# Patient Record
Sex: MALE | Race: WHITE | Employment: UNEMPLOYED | URBAN - NONMETROPOLITAN AREA
[De-identification: names, ages, dates, MRNs, and addresses within clinical notes are randomized per-mention and may not be internally consistent; named-entity substitution may affect disease eponyms.]

---

## 2017-03-28 ENCOUNTER — HOSPITAL ENCOUNTER (OUTPATIENT)
Dept: GENERAL RADIOLOGY | Age: 27
Discharge: HOME | End: 2017-03-28
Payer: COMMERCIAL

## 2017-03-28 DIAGNOSIS — R05.9 COUGH: ICD-10-CM

## 2017-03-28 PROCEDURE — 71020 XR CHEST STANDARD TWO VW: CPT

## 2017-04-07 ENCOUNTER — TRANSCRIBE ORDERS (OUTPATIENT)
Dept: ADMINISTRATIVE | Facility: HOSPITAL | Age: 27
End: 2017-04-07

## 2017-04-07 DIAGNOSIS — M79.641 RIGHT HAND PAIN: Primary | ICD-10-CM

## 2017-04-14 ENCOUNTER — APPOINTMENT (OUTPATIENT)
Dept: MRI IMAGING | Facility: HOSPITAL | Age: 27
End: 2017-04-14

## 2017-04-17 ENCOUNTER — HOSPITAL ENCOUNTER (OUTPATIENT)
Dept: MRI IMAGING | Facility: HOSPITAL | Age: 27
Discharge: HOME | End: 2017-04-17
Admitting: NURSE PRACTITIONER

## 2017-04-17 DIAGNOSIS — M79.641 RIGHT HAND PAIN: ICD-10-CM

## 2017-04-17 PROCEDURE — 73218 MRI UPPER EXTREMITY W/O DYE: CPT

## 2021-12-23 RX ORDER — OMEPRAZOLE 20 MG/1
20 CAPSULE, DELAYED RELEASE ORAL DAILY
COMMUNITY
End: 2023-02-11

## 2021-12-23 RX ORDER — ALBUTEROL SULFATE 90 UG/1
2 AEROSOL, METERED RESPIRATORY (INHALATION) EVERY 4 HOURS PRN
COMMUNITY

## 2022-01-27 ENCOUNTER — OFFICE VISIT (OUTPATIENT)
Dept: GASTROENTEROLOGY | Facility: CLINIC | Age: 32
End: 2022-01-27

## 2022-01-27 VITALS
HEART RATE: 75 BPM | TEMPERATURE: 98.8 F | OXYGEN SATURATION: 98 % | BODY MASS INDEX: 21.47 KG/M2 | SYSTOLIC BLOOD PRESSURE: 130 MMHG | DIASTOLIC BLOOD PRESSURE: 80 MMHG | HEIGHT: 70 IN | WEIGHT: 150 LBS

## 2022-01-27 DIAGNOSIS — Z01.818 PREOPERATIVE TESTING: Primary | ICD-10-CM

## 2022-01-27 DIAGNOSIS — K21.9 GASTROESOPHAGEAL REFLUX DISEASE, UNSPECIFIED WHETHER ESOPHAGITIS PRESENT: Primary | ICD-10-CM

## 2022-01-27 PROCEDURE — 99204 OFFICE O/P NEW MOD 45 MIN: CPT | Performed by: NURSE PRACTITIONER

## 2022-01-27 NOTE — PROGRESS NOTES
"Chief Complaint   Patient presents with   • Heartburn   • Vomiting     Morning Time only        PCP: Mireya Ramires APRN  REFER: Mireya Ramires APRN    Subjective     HPI    Patient presents to office with complaints of increase acid reflux x many years  He has experienced intermittent nausea with emesis, no hematemesis.  No abdominal pain.   Acid reflux previously controlled with omeprazole but he had not taken omeprazole consistently.   Recent difficulty with constipation, relieved with mag citrate.  No weight loss.  No dysphagia.   He has not resumed PPI use.  Denies frequent use of NSAIDs with exception of occasional ASA.      History reviewed. No pertinent past medical history.    Past Surgical History:   Procedure Laterality Date   • COLONOSCOPY         No outpatient medications have been marked as taking for the 1/27/22 encounter (Office Visit) with Yobany Kaba APRN.       Allergies   Allergen Reactions   • Lidocaine Other (See Comments)     unsure       Social History     Socioeconomic History   • Marital status: Single   Tobacco Use   • Smoking status: Current Every Day Smoker     Packs/day: 0.25   • Smokeless tobacco: Never Used   Substance and Sexual Activity   • Alcohol use: Never   • Drug use: Never   • Sexual activity: Defer       Review of Systems   Constitutional: Negative for unexpected weight change.   Respiratory: Negative for shortness of breath.    Cardiovascular: Negative for chest pain.   Gastrointestinal: Negative for abdominal pain and anal bleeding.       Objective     Vitals:    01/27/22 1042   BP: 130/80   Pulse: 75   Temp: 98.8 °F (37.1 °C)   SpO2: 98%   Weight: 68 kg (150 lb)   Height: 177.8 cm (70\")     Body mass index is 21.52 kg/m².    Physical Exam  Constitutional:       Appearance: Normal appearance. He is well-developed.   Eyes:      General: No scleral icterus.  Cardiovascular:      Rate and Rhythm: Regular rhythm.      Heart sounds: Normal heart sounds. No " murmur heard.      Pulmonary:      Effort: Pulmonary effort is normal. No accessory muscle usage.      Breath sounds: Normal breath sounds.   Abdominal:      General: Bowel sounds are normal. There is no distension.      Palpations: Abdomen is soft. There is no mass.      Tenderness: There is no abdominal tenderness. There is no guarding or rebound.   Skin:     General: Skin is warm and dry.      Coloration: Skin is not jaundiced.   Neurological:      Mental Status: He is alert.   Psychiatric:         Behavior: Behavior is cooperative.         Imaging Results (Most Recent)     None          Body mass index is 21.52 kg/m².    Assessment/Plan     Diagnoses and all orders for this visit:    1. Gastroesophageal reflux disease, unspecified whether esophagitis present (Primary)  -     Case Request; Standing  -     Case Request        ESOPHAGOGASTRODUODENOSCOPY WITH ANESTHESIA (N/A)    Take PPI 30 min prior to breakfast   Decrease caffeine, nicotine, etoh- all contribute to acid reflux  Do not eat 2-3 hours within lying down, elevated HOB 4-6 inches     Advised pt to stop use of NSAIDs, Fish Oil, and MV 5 days prior to procedure, per Dr Houser protocol.  Tylenol based products are ok to take.  Pt verbalized understanding.    The risk of the endoscopy were discussed in detail.  We discussed the risk of perforation (one out of 7657-2693, riskier with dilation), bleeding (one out of 500), and the rare risks of infection, adverse reaction to anesthesia, respiratory failure, cardiac failure including MI and adverse reaction to medications, etc.  We discussed consequences that could occur if a risk were to develop such as the need for hospitalization, blood transfusion, surgical intervention, medications, pain and disability and death.  Alternatives include not doing anything, or pursuing an UGI series which only offers a diagnosis with potential less accuracy compared to egd.  The patient verbalizes understanding and agrees to  proceed.  Precautions are currently being put in place due to COVID-19.  I have explained to Franko Miller they will be required to undergo COVID testing prior to their EGD.  Franko Miller verbalized understanding and was willing to proceed.            Yobany Kaba, APRN  01/27/22          There are no Patient Instructions on file for this visit.

## 2022-01-29 ENCOUNTER — LAB (OUTPATIENT)
Dept: LAB | Facility: HOSPITAL | Age: 32
End: 2022-01-29

## 2022-01-29 LAB — SARS-COV-2 ORF1AB RESP QL NAA+PROBE: NOT DETECTED

## 2022-01-29 PROCEDURE — C9803 HOPD COVID-19 SPEC COLLECT: HCPCS

## 2022-01-29 PROCEDURE — U0004 COV-19 TEST NON-CDC HGH THRU: HCPCS | Performed by: NURSE PRACTITIONER

## 2022-02-01 ENCOUNTER — HOSPITAL ENCOUNTER (OUTPATIENT)
Facility: HOSPITAL | Age: 32
Setting detail: HOSPITAL OUTPATIENT SURGERY
Discharge: HOME | End: 2022-02-01
Attending: INTERNAL MEDICINE | Admitting: INTERNAL MEDICINE

## 2022-02-01 ENCOUNTER — ANESTHESIA (OUTPATIENT)
Dept: GASTROENTEROLOGY | Facility: HOSPITAL | Age: 32
End: 2022-02-01

## 2022-02-01 ENCOUNTER — ANESTHESIA EVENT (OUTPATIENT)
Dept: GASTROENTEROLOGY | Facility: HOSPITAL | Age: 32
End: 2022-02-01

## 2022-02-01 VITALS
TEMPERATURE: 97.4 F | OXYGEN SATURATION: 98 % | HEART RATE: 60 BPM | WEIGHT: 150 LBS | DIASTOLIC BLOOD PRESSURE: 72 MMHG | HEIGHT: 69 IN | RESPIRATION RATE: 20 BRPM | SYSTOLIC BLOOD PRESSURE: 104 MMHG | BODY MASS INDEX: 22.22 KG/M2

## 2022-02-01 DIAGNOSIS — K21.9 GASTROESOPHAGEAL REFLUX DISEASE, UNSPECIFIED WHETHER ESOPHAGITIS PRESENT: ICD-10-CM

## 2022-02-01 PROCEDURE — 25010000002 PROPOFOL 10 MG/ML EMULSION: Performed by: NURSE ANESTHETIST, CERTIFIED REGISTERED

## 2022-02-01 PROCEDURE — 87081 CULTURE SCREEN ONLY: CPT | Performed by: INTERNAL MEDICINE

## 2022-02-01 PROCEDURE — 43239 EGD BIOPSY SINGLE/MULTIPLE: CPT | Performed by: INTERNAL MEDICINE

## 2022-02-01 RX ORDER — PROPOFOL 10 MG/ML
VIAL (ML) INTRAVENOUS AS NEEDED
Status: DISCONTINUED | OUTPATIENT
Start: 2022-02-01 | End: 2022-02-01 | Stop reason: SURG

## 2022-02-01 RX ORDER — SODIUM CHLORIDE 9 MG/ML
500 INJECTION, SOLUTION INTRAVENOUS CONTINUOUS PRN
Status: DISCONTINUED | OUTPATIENT
Start: 2022-02-01 | End: 2022-02-01 | Stop reason: HOSPADM

## 2022-02-01 RX ORDER — SODIUM CHLORIDE 0.9 % (FLUSH) 0.9 %
10 SYRINGE (ML) INJECTION AS NEEDED
Status: DISCONTINUED | OUTPATIENT
Start: 2022-02-01 | End: 2022-02-01 | Stop reason: HOSPADM

## 2022-02-01 RX ADMIN — SODIUM CHLORIDE 500 ML: 9 INJECTION, SOLUTION INTRAVENOUS at 10:56

## 2022-02-01 RX ADMIN — PROPOFOL 100 MG: 10 INJECTION, EMULSION INTRAVENOUS at 11:29

## 2022-02-01 NOTE — ANESTHESIA PREPROCEDURE EVALUATION
Anesthesia Evaluation     Patient summary reviewed   no history of anesthetic complications:  NPO Solid Status: > 8 hours  NPO Liquid Status: > 8 hours           Airway   Mallampati: I  TM distance: >3 FB  Neck ROM: full  Dental    (+) poor dentition        Pulmonary    (+) a smoker Current Smoked day of surgery,   Cardiovascular - negative cardio ROS  Exercise tolerance: excellent (>7 METS)        Neuro/Psych- negative ROS  GI/Hepatic/Renal/Endo    (+)  GERD,      Musculoskeletal (-) negative ROS    Abdominal    Substance History      OB/GYN          Other - negative ROS                       Anesthesia Plan    ASA 2     MAC     intravenous induction     Anesthetic plan, all risks, benefits, and alternatives have been provided, discussed and informed consent has been obtained with: patient.        CODE STATUS:

## 2022-02-01 NOTE — ANESTHESIA POSTPROCEDURE EVALUATION
Patient: Franko Miller    Procedure Summary     Date: 02/01/22 Room / Location: [facility] ENDOSCOPY 2 /  [facility]    Anesthesia Start: 1127 Anesthesia Stop: 1132    Procedure: ESOPHAGOGASTRODUODENOSCOPY WITH ANESTHESIA (N/A ) Diagnosis:       Gastroesophageal reflux disease, unspecified whether esophagitis present      (Gastroesophageal reflux disease, unspecified whether esophagitis present [K21.9])    Surgeons: Morgan Houser DO Provider: Paul Lawrence CRNA    Anesthesia Type: MAC ASA Status: 2          Anesthesia Type: MAC    Vitals  Vitals Value Taken Time   /72 02/01/22 1151   Temp     Pulse 52 02/01/22 1153   Resp 25 02/01/22 1135   SpO2 100 % 02/01/22 1153   Vitals shown include unvalidated device data.        Post Anesthesia Care and Evaluation    Patient location during evaluation: PHASE II  Patient participation: complete - patient participated  Level of consciousness: awake  Pain management: adequate  Airway patency: patent  Anesthetic complications: No anesthetic complications  PONV Status: none  Cardiovascular status: acceptable  Respiratory status: acceptable  Hydration status: acceptable  No anesthesia care post op

## 2022-02-02 LAB — UREASE TISS QL: NEGATIVE

## 2023-02-11 ENCOUNTER — HOSPITAL ENCOUNTER (EMERGENCY)
Facility: HOSPITAL | Age: 33
Discharge: HOME | End: 2023-02-11
Attending: EMERGENCY MEDICINE | Admitting: EMERGENCY MEDICINE
Payer: MEDICAID

## 2023-02-11 ENCOUNTER — APPOINTMENT (OUTPATIENT)
Dept: GENERAL RADIOLOGY | Facility: HOSPITAL | Age: 33
End: 2023-02-11
Payer: MEDICAID

## 2023-02-11 VITALS
SYSTOLIC BLOOD PRESSURE: 109 MMHG | WEIGHT: 150 LBS | HEIGHT: 69 IN | BODY MASS INDEX: 22.22 KG/M2 | HEART RATE: 56 BPM | OXYGEN SATURATION: 97 % | TEMPERATURE: 98.1 F | RESPIRATION RATE: 16 BRPM | DIASTOLIC BLOOD PRESSURE: 55 MMHG

## 2023-02-11 DIAGNOSIS — S60.221A CONTUSION OF RIGHT HAND, INITIAL ENCOUNTER: Primary | ICD-10-CM

## 2023-02-11 PROCEDURE — 73130 X-RAY EXAM OF HAND: CPT

## 2023-02-11 PROCEDURE — 99283 EMERGENCY DEPT VISIT LOW MDM: CPT

## 2023-02-11 RX ORDER — HYDROCODONE BITARTRATE AND ACETAMINOPHEN 5; 325 MG/1; MG/1
1 TABLET ORAL ONCE
Status: COMPLETED | OUTPATIENT
Start: 2023-02-11 | End: 2023-02-11

## 2023-02-11 RX ADMIN — HYDROCODONE BITARTRATE AND ACETAMINOPHEN 1 TABLET: 5; 325 TABLET ORAL at 21:26

## 2023-02-12 NOTE — ED PROVIDER NOTES
Subjective   History of Present Illness  32-year-old male presents emergency department with about 1 to 1/2 hours worth of pain and swelling to the right hand.  Right-hand-dominant.  Reports he tripped over a toy at his child's birthday party fell backwards catching himself with his fist behind him.  Pain in the area of the fourth and fifth metacarpal with swelling.  Decreased range of motion due to pain.  No numbness or tingling.  No other injuries.    Family history, surgical history, social history, current medications and allergies are reviewed with the patient and triage documentation and vitals are reviewed.    History provided by:  Patient   used: No        Review of Systems   Constitutional: Negative for chills and fever.   HENT: Negative for congestion and sore throat.    Eyes: Negative.    Respiratory: Negative.    Cardiovascular: Negative for chest pain and palpitations.   Gastrointestinal: Negative for abdominal pain, diarrhea, nausea and vomiting.   Endocrine: Negative.    Genitourinary: Negative.    Musculoskeletal: Positive for arthralgias and joint swelling. Negative for back pain and neck pain.   Skin: Negative for color change, rash and wound.   Allergic/Immunologic: Negative.    Neurological: Negative for weakness, light-headedness, numbness and headaches.   Hematological: Negative.    Psychiatric/Behavioral: Negative.        History reviewed. No pertinent past medical history.    Allergies   Allergen Reactions   • Lidocaine Other (See Comments)     unsure       Past Surgical History:   Procedure Laterality Date   • CARDIAC ABLATION     • COLONOSCOPY     • ENDOSCOPY N/A 2/1/2022    Procedure: ESOPHAGOGASTRODUODENOSCOPY WITH ANESTHESIA;  Surgeon: Morgan Houser DO;  Location: [facility] ENDOSCOPY;  Service: Gastroenterology;  Laterality: N/A;  preop; GERD  postop; normal   PCP Mireya Ramires        Family History   Adopted: Yes       Social History     Socioeconomic History   •  Marital status: Single   Tobacco Use   • Smoking status: Every Day     Packs/day: 0.25     Types: Cigarettes   • Smokeless tobacco: Never   Vaping Use   • Vaping Use: Some days   Substance and Sexual Activity   • Alcohol use: Never   • Drug use: Never   • Sexual activity: Defer           Objective   Physical Exam  Vitals and nursing note reviewed.   Constitutional:       General: He is not in acute distress.     Appearance: Normal appearance. He is normal weight. He is not ill-appearing.   HENT:      Head: Normocephalic and atraumatic.   Cardiovascular:      Rate and Rhythm: Normal rate and regular rhythm.      Pulses: Normal pulses.   Pulmonary:      Effort: Pulmonary effort is normal.      Breath sounds: Normal breath sounds.   Musculoskeletal:         General: Swelling, tenderness and signs of injury present. No deformity.      Right hand: Swelling, tenderness and bony tenderness present. No deformity or lacerations. Decreased range of motion. Normal strength. Normal sensation. There is no disruption of two-point discrimination. Normal capillary refill. Normal pulse.      Cervical back: Normal range of motion and neck supple. No tenderness.   Skin:     General: Skin is warm and dry.      Capillary Refill: Capillary refill takes less than 2 seconds.      Findings: No bruising or erythema.   Neurological:      General: No focal deficit present.      Mental Status: He is alert and oriented to person, place, and time.         Procedures  none         ED Course    Labs Reviewed - No data to display  XR Hand 3+ View Right    Result Date: 2/11/2023  Narrative: EXAM DESCRIPTION/TECHNIQUE: XR HAND 3+ VW RIGHT 2/11/2023 9:18 PM CST [facility]: XR HAND 3 OR MORE VIEWS CLINICAL HISTORY: 32 years Male, pain, swelling COMPARISON: None. FINDINGS: No acute fracture. No dislocation. No destructive or lytic lesion. Soft tissue demonstrate no acute abnormality.     Impression: 1.  No acute osseous injury of the right hand.  Electronically signed by:  Clair Liu MD, LEONARD  2/11/2023 9:38 PM CST Workstation: [id number]        Medical Decision Making  Amount and/or Complexity of Data Reviewed  Radiology: ordered. Decision-making details documented in ED Course.      Risk  Prescription drug management.      Xray imaging reveals no acute osseous abnormality.  Advised on symptomatic treatment and outpatient follow-up.  Agreeable to plan and discharge.  Advised on follow-up outpatient should symptoms persist for possible ligamentous injury or re-imaging.    Final diagnoses:   Contusion of right hand, initial encounter         ED Disposition  ED Disposition     ED Disposition   Discharge    Condition   Stable    Comment   --             [facility] - [facility]  [address] [place] [address] [phone]    As needed         Medication List      Stop    omeprazole 20 MG capsule  Commonly known as: Osmani Villalobos DO  02/11/23 223

## 2023-02-12 NOTE — DISCHARGE INSTRUCTIONS
Please return for new or worsening symptoms.  Rest, ice, compression and elevation help with discomfort.  Tylenol, Motrin per dosing instructions on package alternating every 4 hours for discomfort.

## 2023-03-04 ENCOUNTER — APPOINTMENT (OUTPATIENT)
Dept: GENERAL RADIOLOGY | Facility: HOSPITAL | Age: 33
End: 2023-03-04
Payer: MEDICAID

## 2023-03-04 ENCOUNTER — HOSPITAL ENCOUNTER (EMERGENCY)
Facility: HOSPITAL | Age: 33
Discharge: HOME | End: 2023-03-04
Attending: FAMILY MEDICINE | Admitting: FAMILY MEDICINE
Payer: MEDICAID

## 2023-03-04 VITALS
HEART RATE: 86 BPM | SYSTOLIC BLOOD PRESSURE: 130 MMHG | DIASTOLIC BLOOD PRESSURE: 75 MMHG | RESPIRATION RATE: 18 BRPM | BODY MASS INDEX: 21.47 KG/M2 | OXYGEN SATURATION: 99 % | WEIGHT: 150 LBS | TEMPERATURE: 97.6 F | HEIGHT: 70 IN

## 2023-03-04 DIAGNOSIS — S63.91XA SPRAIN OF RIGHT HAND, INITIAL ENCOUNTER: Primary | ICD-10-CM

## 2023-03-04 PROCEDURE — 99283 EMERGENCY DEPT VISIT LOW MDM: CPT

## 2023-03-04 PROCEDURE — 73130 X-RAY EXAM OF HAND: CPT

## 2023-03-04 RX ORDER — HYDROCODONE BITARTRATE AND ACETAMINOPHEN 5; 325 MG/1; MG/1
1 TABLET ORAL ONCE
Status: COMPLETED | OUTPATIENT
Start: 2023-03-04 | End: 2023-03-04

## 2023-03-04 RX ORDER — MELOXICAM 15 MG/1
15 TABLET ORAL DAILY
Qty: 30 TABLET | Refills: 0 | Status: SHIPPED | OUTPATIENT
Start: 2023-03-04

## 2023-03-04 RX ADMIN — HYDROCODONE BITARTRATE AND ACETAMINOPHEN 1 TABLET: 5; 325 TABLET ORAL at 20:52

## 2023-03-05 NOTE — ED PROVIDER NOTES
Subjective   History of Present Illness  Patient states that he punched a wall in the house yesterday and presents emergency department today with continued swelling and pain in his right hand.  He has full range of motion of his wrist, but does have swelling over the third fourth and fifth metacarpals.    Hand Injury  Location:  Hand  Hand location:  R hand  Injury: yes    Time since incident:  2 days  Pain details:     Quality:  Aching    Radiates to:  Does not radiate    Severity:  Moderate    Duration:  2 days    Timing:  Intermittent    Progression:  Waxing and waning  Dislocation: no    Foreign body present:  No foreign bodies  Relieved by:  Nothing  Worsened by:  Movement  Associated symptoms: no fatigue, no fever and no neck pain        Review of Systems   Constitutional: Negative for appetite change, chills, diaphoresis, fatigue and fever.   HENT: Negative for congestion, ear discharge, ear pain, nosebleeds, rhinorrhea, sinus pressure, sore throat and trouble swallowing.    Eyes: Negative for discharge and redness.   Respiratory: Negative for apnea, cough, chest tightness, shortness of breath and wheezing.    Cardiovascular: Negative for chest pain.   Gastrointestinal: Negative for abdominal pain, diarrhea, nausea and vomiting.   Endocrine: Negative for polyuria.   Genitourinary: Negative for dysuria, frequency and urgency.   Musculoskeletal: Positive for joint swelling. Negative for myalgias and neck pain.   Skin: Negative for color change and rash.   Allergic/Immunologic: Negative for immunocompromised state.   Neurological: Negative for dizziness, seizures, syncope, weakness, light-headedness and headaches.   Hematological: Negative for adenopathy. Does not bruise/bleed easily.   Psychiatric/Behavioral: Negative for behavioral problems and confusion.   All other systems reviewed and are negative.      History reviewed. No pertinent past medical history.    Allergies   Allergen Reactions   • Lidocaine  Other (See Comments)     unsure       Past Surgical History:   Procedure Laterality Date   • CARDIAC ABLATION     • COLONOSCOPY     • ENDOSCOPY N/A 2/1/2022    Procedure: ESOPHAGOGASTRODUODENOSCOPY WITH ANESTHESIA;  Surgeon: Morgan Houser DO;  Location: [facility] ENDOSCOPY;  Service: Gastroenterology;  Laterality: N/A;  preop; GERD  postop; normal   PCP Mireya Ramires        Family History   Adopted: Yes       Social History     Socioeconomic History   • Marital status: Single   Tobacco Use   • Smoking status: Every Day     Packs/day: 0.25     Types: Cigarettes   • Smokeless tobacco: Never   Vaping Use   • Vaping Use: Some days   Substance and Sexual Activity   • Alcohol use: Never   • Drug use: Never   • Sexual activity: Defer           Objective   Physical Exam  Vitals and nursing note reviewed.   Constitutional:       Appearance: He is well-developed.   HENT:      Head: Normocephalic and atraumatic.      Nose: Nose normal.   Eyes:      General: No scleral icterus.        Right eye: No discharge.         Left eye: No discharge.      Conjunctiva/sclera: Conjunctivae normal.      Pupils: Pupils are equal, round, and reactive to light.   Neck:      Trachea: No tracheal deviation.   Cardiovascular:      Rate and Rhythm: Normal rate and regular rhythm.      Heart sounds: Normal heart sounds. No murmur heard.  Pulmonary:      Effort: Pulmonary effort is normal. No respiratory distress.      Breath sounds: Normal breath sounds. No stridor. No wheezing or rales.   Abdominal:      General: Bowel sounds are normal. There is no distension.      Palpations: Abdomen is soft. There is no mass.      Tenderness: There is no abdominal tenderness. There is no guarding or rebound.   Musculoskeletal:      Right hand: Swelling, tenderness and bony tenderness present.        Arms:       Cervical back: Normal range of motion and neck supple.   Skin:     General: Skin is warm and dry.      Findings: No erythema or rash.   Neurological:       Mental Status: He is alert and oriented to person, place, and time.      Coordination: Coordination normal.   Psychiatric:         Behavior: Behavior normal.         Thought Content: Thought content normal.         Procedures           ED Course              Labs Reviewed - No data to display    XR Hand 3+ View Right    (Results Pending)                                         MDM    Final diagnoses:   Sprain of right hand, initial encounter       ED Disposition  ED Disposition     ED Disposition   Discharge    Condition   Stable    Comment   --             Jonathan, Dannielle HATHAWAY, APRN  [address] [facility] [address] [place] [id number] [phone]    In 1 week           Medication List      New Prescriptions    meloxicam 15 MG tablet  Commonly known as: MOBIC  Take 1 tablet by mouth Daily.           Where to Get Your Medications      These medications were sent to [facility] - [place], [place] - [address] - [phone]  - [phone] [facility] [place] [address]    Phone: [phone]   · meloxicam 15 MG tablet          Geovani Murphy MD  03/04/23 8756

## (undated) DEVICE — SENSR O2 OXIMAX FNGR A/ 18IN NONSTR

## (undated) DEVICE — FRCP BX RADJAW4 NDL 2.8 240 STD OG

## (undated) DEVICE — THE CHANNEL CLEANING BRUSH IS A NYLON FLEXI BRUSH ATTACHED TO A FLEXIBLE PLASTIC SHEATH DESIGNED TO SAFELY REMOVE DEBRIS FROM FLEXIBLE ENDOSCOPES.

## (undated) DEVICE — CONMED SCOPE SAVER BITE BLOCK, 20X27 MM: Brand: SCOPE SAVER

## (undated) DEVICE — CUFF,BP,DISP,1 TUBE,ADULT,HP: Brand: MEDLINE

## (undated) DEVICE — TBG SMPL FLTR LINE NASL 02/C02 A/ BX/100

## (undated) DEVICE — YANKAUER,BULB TIP WITH VENT: Brand: ARGYLE

## (undated) DEVICE — Device: Brand: DEFENDO AIR/WATER/SUCTION AND BIOPSY VALVE